# Patient Record
Sex: FEMALE | Race: BLACK OR AFRICAN AMERICAN
[De-identification: names, ages, dates, MRNs, and addresses within clinical notes are randomized per-mention and may not be internally consistent; named-entity substitution may affect disease eponyms.]

---

## 2018-12-13 ENCOUNTER — HOSPITAL ENCOUNTER (EMERGENCY)
Dept: HOSPITAL 88 - ER | Age: 68
Discharge: HOME | End: 2018-12-13
Payer: COMMERCIAL

## 2018-12-13 VITALS — HEIGHT: 68 IN | BODY MASS INDEX: 36.37 KG/M2 | WEIGHT: 240 LBS

## 2018-12-13 VITALS — SYSTOLIC BLOOD PRESSURE: 155 MMHG | DIASTOLIC BLOOD PRESSURE: 73 MMHG

## 2018-12-13 DIAGNOSIS — S40.011A: Primary | ICD-10-CM

## 2018-12-13 DIAGNOSIS — E78.5: ICD-10-CM

## 2018-12-13 DIAGNOSIS — I10: ICD-10-CM

## 2018-12-13 DIAGNOSIS — Z98.0: ICD-10-CM
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
## 2018-12-13 DIAGNOSIS — S46.011A: ICD-10-CM

## 2018-12-13 DIAGNOSIS — E66.9: ICD-10-CM

## 2018-12-13 PROCEDURE — 99283 EMERGENCY DEPT VISIT LOW MDM: CPT

## 2018-12-13 NOTE — XMS REPORT
Summary of Care: 17 - 17

                             Created on: 2076



LISSETTEMELODY

External Reference #: [id number]

: 1950

Sex: Female



Demographics







                          Address                   [address] [place]  [address]-

 

                          Home Phone                [phone]

 

                          Preferred Language        English

 

                          Marital Status            Single

 

                          Gnosticism Affiliation     None

 

                          Race                      

 

                          Ethnic Group              Non-





Author







                          Author                    [facility]

 

                          Organization              [facility]

 

                          Address                   Unknown

 

                          Phone                     Unavailable







Encounter





GRACIE Keith(LISA) [id number] Date(s): 17 - 17

[facility] [address] [place] [address]-     [phone]
[phone]

Discharge Disposition: Home or Self Care

Attending Physician: Eloise Butt MD

Referring Physician: Eloise Butt MD





Vital Signs





No data available for this section



Problem List







    



              Condition     Effective Dates     Status       Health Status     Informant

 

    



                     Abdominal wall pain1     3/8/13              Active  

 

    



                           Anemia2                   Active  

 

    



                           Cough(Confirmed)          Active  

 

    



                           Diverticulitis(Confi      Active  



                                         rmed)    

 

    



                           Hernia(Confirmed)3        Active  

 

    



                           Hypercholesteremia(C      Resolved  



                                         onfirmed)    

 

    



                           Hypertension(Confirm      Active  



                                         ed)    

 

    



                     Incisional hernia4     3/15/13             Active  







1Data migrated from [employer] on 5/30/15.



2Data migrated from [employer] on 5/30/15.



3incisional



4Data migrated from [employer] on 5/30/15.



Allergies, Adverse Reactions, Alerts







   



                 Substance       Reaction        Severity        Status

 

   



                           NKDA                      Active







Medications





No data available for this section



Results





No data available for this section



Immunizations





No data available for this section



Procedures







   



                 Procedure       Date            Related Diagnosis     Body Site

 

   



                                         Colon operation1   

 

   



                                         Hernia repair   

 

   



                                         Hernia repair   

 

   



                                         Operation2   

 

   



                                         Operation3   







1colostomy



2colostomy closure and hysterectomy



3percutaneous drain



Social History





No data available for this section



Assessment and Plan





No data available for this section

## 2018-12-13 NOTE — XMS REPORT
Summary of Care: 11/3/16 - 11/3/16

                             Created on: 08/10/2051



LISSETTEMELODY

External Reference #: [id number]

: 1950

Sex: Female



Demographics







                          Address                   [address] [place] [place]  [address]-

 

                          Home Phone                [phone]

 

                          Preferred Language        English

 

                          Marital Status            Single

 

                          Zoroastrian Affiliation     None

 

                          Race                      

 

                          Ethnic Group              Non-





Author







                          Author                    [facility]

 

                          Organization              [facility]

 

                          Address                   Unknown

 

                          Phone                     Unavailable







Encounter





HQ Inna(LISA) [id number] Date(s): 11/3/16 - 11/3/16

[facility] [address] [place] [address]-     [phone]
[phone]

Discharge Disposition: Home or Self Care

Attending Physician: Eloise Butt MD

Referring Physician: Eloise Butt MD





Vital Signs





No data available for this section



Problem List







    



              Condition     Effective Dates     Status       Health Status     Informant

 

    



                     Abdominal wall pain1     3/8/13              Active  

 

    



                           Anemia2                   Active  

 

    



                           Cough(Confirmed)          Active  

 

    



                           Diverticulitis(Confi      Active  



                                         rmed)    

 

    



                           Hernia(Confirmed)3        Active  

 

    



                           Hypercholesteremia(C      Resolved  



                                         onfirmed)    

 

    



                           Hypertension(Confirm      Active  



                                         ed)    

 

    



                     Incisional hernia4     3/15/13             Active  







1Data migrated from [employer] on 5/30/15.



2Data migrated from [employer] on 5/30/15.



3incisional



4Data migrated from [employer] on 5/30/15.



Allergies, Adverse Reactions, Alerts







   



                 Substance       Reaction        Severity        Status

 

   



                           NKDA                      Active







Medications





No data available for this section



Results





No data available for this section



Immunizations





No data available for this section



Procedures







   



                 Procedure       Date            Related Diagnosis     Body Site

 

   



                                         Colon operation1   

 

   



                                         Hernia repair   

 

   



                                         Hernia repair   

 

   



                                         Operation2   

 

   



                                         Operation3   







1colostomy



2colostomy closure and hysterectomy



3percutaneous drain



Social History





No data available for this section



Assessment and Plan





No data available for this section

## 2018-12-13 NOTE — XMS REPORT
Summary of Care: 10/18/16 - 10/18/16

                             Created on: 2039



LISSETTEMELODY

External Reference #: [id number]

: 1950

Sex: Female



Demographics







                          Address                   [address] [place]  [address]-

 

                          Home Phone                [phone]

 

                          Preferred Language        English

 

                          Marital Status            Single

 

                          Scientology Affiliation     None

 

                          Race                      

 

                          Ethnic Group              Non-





Author







                          Author                    [facility]

 

                          Organization              [facility]

 

                          Address                   Unknown

 

                          Phone                     Unavailable







Encounter





GRACIE Keith(LISA) [id number] Date(s): 10/18/16 - 10/18/16

[facility] [address] [place] [address]-     [phone]
[phone]

Discharge Disposition: Home or Self Care

Attending Physician: Eloise Butt MD

Referring Physician: Eloise Butt MD





Vital Signs





No data available for this section



Problem List







    



              Condition     Effective Dates     Status       Health Status     Informant

 

    



                     Abdominal wall pain1     3/8/13              Active  

 

    



                           Anemia2                   Active  

 

    



                           Cough(Confirmed)          Active  

 

    



                           Diverticulitis(Confi      Active  



                                         rmed)    

 

    



                           Hernia(Confirmed)3        Active  

 

    



                           Hypercholesteremia(C      Resolved  



                                         onfirmed)    

 

    



                           Hypertension(Confirm      Active  



                                         ed)    

 

    



                     Incisional hernia4     3/15/13             Active  







1Data migrated from [employer] on 5/30/15.



2Data migrated from [employer] on 5/30/15.



3incisional



4Data migrated from [employer] on 5/30/15.



Allergies, Adverse Reactions, Alerts







   



                 Substance       Reaction        Severity        Status

 

   



                           NKDA                      Active







Medications





No data available for this section



Results





No data available for this section



Immunizations





No data available for this section



Procedures







   



                 Procedure       Date            Related Diagnosis     Body Site

 

   



                                         Colon operation1   

 

   



                                         Hernia repair   

 

   



                                         Hernia repair   

 

   



                                         Operation2   

 

   



                                         Operation3   







1colostomy



2colostomy closure and hysterectomy



3percutaneous drain



Social History





No data available for this section



Assessment and Plan





No data available for this section

## 2018-12-13 NOTE — XMS REPORT
Continuity of Care Document

                             Created on: 2017



MELODY MCLAUGHLIN

External Reference #: [id number]

: 1950

Sex: Female



Demographics







                          Address                   [address] [place] [facility] [place] [place]  [address]

 

                          Home Phone                [phone]

 

                          Preferred Language        Unknown

 

                          Marital Status            Unknown

 

                          Buddhist Affiliation     Unknown

 

                          Race                      Unknown

 

                          Ethnic Group              Unknown





Author







                          Author                    [facility]

 

                          [facility]              Interface

 

                          Address                   Unknown

 

                          Phone                     Unavailable



                                                    



Problems

                    





                    Problem                            Status                            Onset Date     

                          Classification                            Date Reported       

                          Comments                            Source                    

 

                          E04.1 NONTOXIC SINGLE THYROID NODULE                            Active            

                    04/10/2017                                                             

                                                      [facility]                    

 

                    CALFICATION                            Active                            2016 

                                                                                       

                                        [facility]                    

 

                    CALIFICATION                            Active                            10/26/2016

                                                                                       

                                        [facility]                    

 

                          M85.80 OTHER SPECIFIED DISORDERS OF BONE                            Active        

                    10/03/2016                                                         

                                                       [facility]                  

  

 

                          Incisional hernia<sup>4</sup>                            Active                   

                    03/15/2013                            Problem                            2017

                                        Data migrated from [facility] on 5/30/15.           

                                        [facility]                    

 

                          .21 / CPT 07726 52521                            Active                    

                    03/15/2013                                                                     

                                                      [facility]                    

 

                          .21 / CPT 10547 29710                                        Active        

                    03/15/2013                                                         

                                                       [facility]                  

  

 

                    UNK                            Active                            03/15/2013         

                                                                                        

                                        [facility]                    

 

                          Abdominal wall pain<sup>1</sup>                            Active                 

                    2013                            Problem                            2017

                                        Data migrated from [facility] on 5/30/15.           

                                        [facility]                    

 

                    ABDOMINAL PAIN                            Active                            2013

                                                                                       

                                        [facility]                    

 

                    Anemia<sup>2</sup>                            Active                                

                          Problem                            2017                

                          Data migrated from [facility] on 5/30/15.                            [facility]                    

 

                    Cough                            Active                                             

                    Problem                            2017                               

                                        [facility]                    

 

                    Diverticulitis                            Active                                    

                          Problem                            2017                    

                                                      [facility]                    

 

                    Hernia<sup>3</sup>                            Active                                

                          Problem                            2017                

                          incisional                            [facility]                    

 

                    Hypercholesteremia                            Resolved                              

                          Problem                            2017              

                                                      [facility]                    

 

                    Hypertension                            Active                                      

                          Problem                            2017                      

                                                      [facility]                    

 

                          OTH DISRD OF BONE DENSITY AND STRUCTURE,                            Active        

                                                                                       

                                                      [facility]                    

 

                          ENCNTR SCREEN MAMMOGRAM FOR MALIGNANT NE                            Active        

                                                                                       

                                                      [facility]                    

 

                          MAMMOGRAPHIC CALCIFCN FOUND ON DIAGNOSTI                            Active        

                                                                                       

                                                      [facility]                    

 

                          NONTOXIC SINGLE THYROID NODULE                            Active                  

                                                                                                 

                                                      [facility]                    



                                                                                
                                                                                
                                                                                
                                                                                
                                                                    



Medications

                    





                    Medication                            Details                            Route      

                          Status                            Patient Instructions         

                          Ordering Provider                            Order Date           

                                        Source                    



                                                                        



Allergies, Adverse Reactions, Alerts

                    





                    Substance                            Category                            Reaction   

                          Severity                            Reaction type           

                          Status                            Date Reported                     

                          Comments                            Source                    



                                                                



Immunizations

                    





                    Immunization                            Date Given                            Site  

                          Status                            Last Updated             

                          Comments                            Source                    



                                                                        



Results

                    





                    Order Name                            Results                            Value      

                          Reference Range                            Date                

                          Interpretation                            Comments                       

                                        Source                    

 

                    Thyroid US                            Thyroid US                            Clinical

 Indication: E04.1   Nontoxic single thyroid nodule; 

Comparison: None



TECHNIQUE:



Sonographic evaluation of the thyroid gland is performed



FINDINGS: 



The right thyroid gland measures 5.6 x 2.8 x 3.4 cm. 

The left thyroid gland measures 4.5 x 1.7 x 1.7 cm. 

The thyroid isthmus is unremarkable



Multiple thyroid nodules, as described below:

                                        3.5 cm right upper/mid nodule which is heterogeneous but solid appearing with internal

 vascularity

                                        2.2 cm right lower/mid thyroid nodule which is primarily cystic with thin internal

 smaller nodule. No definite vascularity.



                                        1.6 cm left upper lobe nodule which is heterogeneous, without microcalcifications

 or definite internal vascularity

                                        1.4 cm left midpole nodule, mixed solid and cystic without microcalcifications or

 internal vascularity

                                        0.5 cm left lower lobe nodule, hypoechoic without internal vascularity.



There is no adjacent jugular chain lymphadenopathy.



IMPRESSION:

                                        1. Multiple bilateral thyroid nodules. 



Recommend FNA of the solid-appearing 3.5 cm right upper pole nodule and of the 
heterogeneous primarily solid 1.6 cm left upper pole nodule, if not already 
performed.



SL:  [id number]



                                                          2017                 

                                                      -

                                        -





Read by:  Ramon Rosas MD

Dictated Date/time:  17 11:36

Electronically Signed by:  Ramon Rosas MD               17 
11:40

FINAL REPORT

                                        [facility]                    

 

                          Digital Mammo DX Uni MA                            Digital Mammo DX Uni MA        

                                         - DIGITAL MAMMO DX UNI MA/L

UNILATERAL LEFT DIGITAL DIAGNOSTIC MAMMOGRAM WITH CAD: 11/3/2016

CLINICAL: Mammographic Abnormality.  



Current study was evaluated with a Computer Aided Detection (CAD) system.  

Comparison is made to exams dated:  10/18/2016 mammogram, 3/11/2013 mammogram, 
2010 ultrasound, 2010 mammogram and 2010 mammogram - [facility].  

The tissue of the left breast is heterogeneously dense, which could obscure 
detection of small masses.  

There are benign appearing calcifications in the left breast.  There also are 
benign appearing scattered densities in the left breast.  

There are multiple clustered grouped calcifications in the left breast anterior 
to middle depth.  These correlate with the prior exam.  

No other significant masses or calcifications are seen in the breast.  



IMPRESSION: SUSPICIOUS OF MALIGNANCY



The multiple clustered grouped calcifications in the left breast are at an 
intermediate suspicion for malignancy.  A stereotactic biopsy is recommended for
the most suspicious cluster at 9 o'clock.



A phone call was made to the physician's office and the results were reviewed 
with the patient.  



SUMMARY:

The patient scheduled her procedure prior to leaving the [facility].   Critical findings were called to Lily in the 
physician's office at 1150 hours on the day of the exam. An order for this 
procedure will need to be provided by the referring physician.



As the patient is on anti-coagulation medication, this should be discontinued 
prior to the biopsy with the referring physician's approval.  





Femi nina/:11/3/2016 15:03:06  



Imaging Technologist: Cinda Thurston, [facility]

This exam was dictated and interpreted by [id number] for [facility].  

letter sent: Biopsy  

Mammogram BI-RADS: 4b Suspicious abnormality - intermediate suspicion of 
malignancy

                                                          2016                 

                                                      -

                                        -





Read by:  Femi Carpenter MD

Dictated Date/time:  16 15:03

Electronically Signed by:  Femi Carpenter MD                           16 
15:03

FINAL REPORT

                                        [facility]                    

 

                          Digital Mammo Screening Mel MA                            Digital Mammo Screening 

Mel MA                                   - DIGITAL MAMMO SCREENING MEL MA

BILATERAL DIGITAL SCREENING MAMMOGRAM WITH CAD: 10/18/2016

CLINICAL: Routine.  



Current study was evaluated with a Computer Aided Detection (CAD) system.  

Comparison is made to exams dated:  3/11/2013 mammogram, 2010 mammogram, 
2010 mammogram - [facility] and 10/9/2002 mammogram.  

The tissue of both breasts is heterogeneously dense, which could obscure 
detection of small masses.  

There are benign appearing calcifications in both breasts.  There also are 
benign appearing scattered densities in both breasts.  

There are clustered calcifications in the left breast at 8 o'clock middle depth.
 

No other significant masses, calcifications, or other findings are seen in 
either breast.  



IMPRESSION: INCOMPLETE: NEEDS ADDITIONAL IMAGING EVALUATION

The clustered calcifications in the left breast are indeterminate.  Left 
diagnostic mammogram with possible ultrasound is recommended (magnification 
views and lateral view).  





Femi nina/penrad:10/18/2016 15:31:00  



Imaging Technologist: Nunu Busch, [facility]

This exam was dictated and interpreted by [id number] for [facility].  

letter sent: Additional Imaging  

Mammogram BI-RADS: 0 Indeterminate

                                                          10/18/2016                 

                                                      -

                                        -





Read by:  Femi Carpenter MD

Dictated Date/time:  10/18/16 15:31

Electronically Signed by:  Femi Carpenter MD                           10/18/16 
15:31

FINAL REPORT

                                        [facility]                    

 

                          Bone Density Scan                            Bone Density Scan                    

                                        Patient Name: MELODY MCLAUGHLIN

: 1950; Age: 66 years  y/o Female

MR: [id number]



Study: Bone Density Scan 10/18/2016 1:01 PM CDT

Ordering Physician: Eloise Sloan MD



Clinical Indication: M85.80   Other specified disorders of bone density and 
structure, unspecified site.    

Comparison: None



FINDINGS:



The axial lumbar bone mineral density is 106% of the expected age matched bone 
mass with a T-score 0.7.  Axial lumbar average BMD is 1.223 g/cm2. 



The left femoral neck bone mineral density is 96% of the expected age matched 
bone mass with a T-score of -0.3.  Left femoral neck BMD is 0.913 g/cm2.  The 
total femoral BMD is 1.017 g/cm2. 

 

 

IMPRESSION:   

                                        1. Normal bone mineral density of the lumbar spine.

                                        2. Normal bone mineral density of the left femoral neck.

 

The World Health Organization has established that OSTEOPOROSIS occurs at -2.5 
or more standard deviations (SD) below peak bone mass. OSTEOPENIA (low bone 
mass) occurs at -1.0 standard deviations to -2.5 standard deviations below peak 
bone mass.   

 





SL:  [id number]



                                                          10/18/2016                 

                                                      -

                                        -





Read by:  Nahum Garnica MD

Dictated Date/time:  10/18/16 13:56

Electronically Signed by:  Nahum Garnica MD                10/18/16 
13:57

FINAL REPORT

                                        [facility]                    



                                                                                
                                                               



Vital Signs

                    





                    Vital Sign                            Value                            Date         

                          Comments                            Source                    



                                                                        



Encounters

                    





                    Location                            Location Details                            Encounter

 Type                            Encounter Number                            Reason For

 Visit                            Attending Provider                            ADM Date

                            DC Date                            Status                

                                        Source                    

 

                    [facility]                                                        Outpatient      

                          [id number]                            ABDOMINAL PAIN         

                          ELOISE SLOAN                             2013      

                                                  Active                            [facility]                                                        OU              

                          [id number]                            .21 / CPT 90243 88510   

                       .                            [facility]                  

                    2013                            

Active                                  [facility]                                               

                    Outpatient                            [id number]                            

                            Eloise Sloan                             10/18/2016

                            10/19/2016                                               

                                        [facility]                                               

                    Outpatient                            [id number]                            

                            Eloise Sloan                             2016                                               

                                        [facility]                                               

                    Outpatient                            [id number]                            

                            Eloise Sloan                             2017                                               

                                        [facility]                    



                                                                                
                                                   



Procedures

                    





                    Procedure                            Code                            Date           

                          Perfomer                            Comments                        

                                        Source                    

 

                          Colon operation<sup>1</sup>                            [id number]                   

                                                                            colostomy             

                                        [facility]                    

 

                    Hernia repair                            [id number]                                   

                                                                             [facility]

                    

 

                          Operation<sup>2</sup>                            [id number]                        

                                                                            colostomy closure and hysterectomy

                                        [facility]                    

 

                          Operation<sup>3</sup>                            [id number]                        

                                                                            percutaneous drain         

                                        [facility]
